# Patient Record
Sex: FEMALE | Race: WHITE | NOT HISPANIC OR LATINO | Employment: OTHER | URBAN - METROPOLITAN AREA
[De-identification: names, ages, dates, MRNs, and addresses within clinical notes are randomized per-mention and may not be internally consistent; named-entity substitution may affect disease eponyms.]

---

## 2021-05-28 ENCOUNTER — RECORDS - HEALTHEAST (OUTPATIENT)
Dept: ADMINISTRATIVE | Facility: CLINIC | Age: 31
End: 2021-05-28

## 2024-04-05 ENCOUNTER — HOSPITAL ENCOUNTER (EMERGENCY)
Facility: HOSPITAL | Age: 34
Discharge: HOME | End: 2024-04-05
Admitting: PHYSICIAN ASSISTANT
Payer: COMMERCIAL

## 2024-04-05 VITALS
DIASTOLIC BLOOD PRESSURE: 80 MMHG | HEART RATE: 105 BPM | SYSTOLIC BLOOD PRESSURE: 130 MMHG | HEIGHT: 65 IN | TEMPERATURE: 98 F | OXYGEN SATURATION: 98 % | RESPIRATION RATE: 20 BRPM | WEIGHT: 160 LBS | BODY MASS INDEX: 26.66 KG/M2

## 2024-04-05 DIAGNOSIS — K04.7 DENTAL INFECTION: ICD-10-CM

## 2024-04-05 LAB — HCG UR QL: NEGATIVE

## 2024-04-05 PROCEDURE — 99284 EMERGENCY DEPT VISIT MOD MDM: CPT

## 2024-04-05 PROCEDURE — 81025 URINE PREGNANCY TEST: CPT | Performed by: PHYSICIAN ASSISTANT

## 2024-04-05 PROCEDURE — 250N000013 HC RX MED GY IP 250 OP 250 PS 637: Performed by: PHYSICIAN ASSISTANT

## 2024-04-05 PROCEDURE — 96372 THER/PROPH/DIAG INJ SC/IM: CPT | Performed by: PHYSICIAN ASSISTANT

## 2024-04-05 PROCEDURE — 250N000011 HC RX IP 250 OP 636: Performed by: PHYSICIAN ASSISTANT

## 2024-04-05 RX ORDER — KETOROLAC TROMETHAMINE 30 MG/ML
30 INJECTION, SOLUTION INTRAMUSCULAR; INTRAVENOUS ONCE
Status: COMPLETED | OUTPATIENT
Start: 2024-04-05 | End: 2024-04-05

## 2024-04-05 RX ADMIN — KETOROLAC TROMETHAMINE 30 MG: 30 INJECTION, SOLUTION INTRAMUSCULAR; INTRAVENOUS at 10:26

## 2024-04-05 RX ADMIN — AMOXICILLIN AND CLAVULANATE POTASSIUM 1 TABLET: 875; 125 TABLET, FILM COATED ORAL at 09:59

## 2024-04-05 ASSESSMENT — COLUMBIA-SUICIDE SEVERITY RATING SCALE - C-SSRS
6. HAVE YOU EVER DONE ANYTHING, STARTED TO DO ANYTHING, OR PREPARED TO DO ANYTHING TO END YOUR LIFE?: NO
1. IN THE PAST MONTH, HAVE YOU WISHED YOU WERE DEAD OR WISHED YOU COULD GO TO SLEEP AND NOT WAKE UP?: NO

## 2024-04-05 ASSESSMENT — ACTIVITIES OF DAILY LIVING (ADL): ADLS_ACUITY_SCORE: 33

## 2024-04-05 NOTE — ED TRIAGE NOTES
Patient with right sided dental pain for 2 days, does have an appointment to see dentist next week.

## 2024-04-05 NOTE — ED PROVIDER NOTES
Emergency Department Encounter   NAME: Ijeoma Mixon ; AGE: 34 year old female ; YOB: 1990 ; MRN: [id number] ; PCP: No primary care provider on file.   ED PROVIDER: Linda Herzog PA-C    Evaluation Date & Time:   4/5/2024  9:10 AM    CHIEF COMPLAINT:  Dental Pain      Impression and Plan   MDM:   Ijeoma Mixon is a 34 year old female with a pertinent history of HIV, methamphetamine abuse, methadone use disorder, asthma, who presents to the ED by private vehicle for evaluation of dental pain.  The patient presents to the emergency department for evaluation of 1 week of worsening right upper dental pain.  Upon arrival to the ER, she is afebrile vitally stable.  She is in no acute distress and is nontoxic, however does appear uncomfortable.  Tooth #15 has previous filling, is cracked with exposed pulp, and has surrounding gingival fullness consistent with likely infection.  There is no drainable periapical abscess.  No evidence of a buccal cellulitis or deep soft tissue space infection.  No submandibular fullness, sublingual fullness or tenderness to suggest Kirill angina.  She does have HIV, however this has been well-controlled on Biktarvy, her last 2 HIV viral loads per chart review with undetectable levels. She is still taking her Biktarvy as prescribed without any missed doses. She does have right sided facial pain and headache though I suspect this is all referred pain from her tooth. No fevers, chills or systemic infectious symptoms. She is alert, oriented, and has no meningeal signs on exam. No concern for meningitis, encephalitis, or sinister infectious process at this time. Offered dental block, however patient declined. She requested Toradol which is reasonable and ordered.     Patient was started on Augmentin for dental infection.  Advised to keep her dental appointment on Wednesday as scheduled.  We reviewed concerning signs and symptoms to return to the emergency department at length  especially given her HIV history and she verbalized understanding.  Discharged home in stable condition.  Suspect her very mild elevated heart rate is due to her discomfort.    Medical Decision Making    History:  Supplemental history from: Documented in chart  External Record(s) reviewed: Outpatient Record: Clinic visit on 11/7/2023; HIV labs from 11/7/23 and 6/7/2023     Work Up:  Chart documentation includes differential considered and any EKGs or imaging independently interpreted by provider, where specified.  In additional to work up documented, I considered the following work up: Documented in chart, if applicable.    External consultation:  Discussion of management with another provider: Documented in chart, if applicable    Complicating factors:  Care impacted by chronic illness: Other: HIV; substance use;   Care affected by social determinants of health: Access to Medical Care    Disposition considerations: Discharge. I prescribed additional prescription strength medication(s) as charted. See documentation for any additional details.      ED COURSE:  9:43 AM I met and introduced myself to the patient. I gathered initial history and performed my physical exam. We discussed plan for initial workup.   10:15 AM I rechecked the patient and discussed results, discharge, follow up, and reasons to return to the ED.     At the conclusion of the encounter I discussed the results of all the tests and the disposition. The questions were answered. The patient or family acknowledged understanding and was agreeable with the care plan.    FINAL IMPRESSION:    ICD-10-CM    1. Dental infection  K04.7             MEDICATIONS GIVEN IN THE EMERGENCY DEPARTMENT:  Medications   ketorolac (TORADOL) injection 30 mg (30 mg Intramuscular $Given 4/5/24 1023)   amoxicillin-clavulanate (AUGMENTIN) 875-125 MG per tablet 1 tablet (1 tablet Oral $Given 4/5/24 3024)         NEW PRESCRIPTIONS STARTED AT TODAY'S ED VISIT:  Discharge  "Medication List as of 4/5/2024 10:27 AM        START taking these medications    Details   amoxicillin-clavulanate (AUGMENTIN) 875-125 MG tablet Take 1 tablet by mouth 2 times daily for 7 days, Disp-14 tablet, R-0, Local Print               HPI   Patient information was obtained from: Patient    Use of Intrepreter: N/A     Ijeoma Mixon is a 34 year old female with a pertinent history of HIV, methamphetamine abuse, methadone use disorder, asthma, who presents to the ED by private vehicle for evaluation of dental pain.     Per patient, she has had some ongoing right upper dental pain for some time.  She has a broken tooth at the site of a previous filling, however over the past week, pain has become significantly worse.  She was unable to get into her dentist and [place] until this Wednesday, and came into the ER due to pain.  She has not had fevers or chills, however she has had right-sided headache and facial pain as well as felt nauseated. No fevers or chills.  Has been using Tylenol and Motrin at home.    REVIEW OF SYSTEMS:  Pertinent positive and negative symptoms per HPI.       Medical History     No past medical history on file.    No past surgical history on file.    No family history on file.    Social History     Tobacco Use    Smoking status: Smoker, Current Status Unknown   Substance Use Topics    Alcohol use: No       amoxicillin-clavulanate (AUGMENTIN) 875-125 MG tablet  emtricitabine-tenofovir (TRUVADA) 200-300 mg per tablet  fluticasone (FLONASE) 50 mcg/actuation nasal spray  HYDROcodone-acetaminophen 5-325 mg per tablet  HYDROcodone-acetaminophen 5-325 mg per tablet  raltegravir (ISENTRESS) 400 mg tablet          Physical Exam     First Vitals:  Patient Vitals for the past 24 hrs:   BP Temp Pulse Resp SpO2 Height Weight   04/05/24 0908 130/80 98  F (36.7  C) 105 20 98 % 1.651 m (5' 5\") 72.6 kg (160 lb)         PHYSICAL EXAM:   Physical Exam  Constitutional:       General: She is not in acute " distress.     Appearance: She is not toxic-appearing.   HENT:      Mouth/Throat:      Mouth: Mucous membranes are moist.      Pharynx: Oropharynx is clear.      Comments: Posterior oropharynx is clear.  No sublingual edema or tenderness.  No tongue displacement.  No submandibular fullness.  No facial swelling or induration.  No trismus.  Speaking with normal phonation.  No drooling. Poor dentition. Right upper molar (tooth #15) had previous filling, is cracked with exposed pulp and is exquisitely tender. Surrounding gingival inflammation present. No fluctuance.   Neurological:      Mental Status: She is alert.             Results     LAB:  All pertinent labs reviewed and interpreted  Labs Ordered and Resulted from Time of ED Arrival to Time of ED Departure   HCG QUALITATIVE URINE - Normal       Result Value    hCG Urine Qualitative Negative         RADIOLOGY:  No orders to display         Linda Herzog PA-C   Emergency Medicine   [facility] EMERGENCY DEPARTMENT       Linda Herzog PA-C  04/05/24 2441

## 2024-04-05 NOTE — DISCHARGE INSTRUCTIONS
You were seen at the Hospital Emergency Department. Please bring this paper work to your followup appointment for the next provider to review and continue providing your care.  We believe that an infected tooth is the cause of your dental pain. For your infection, please take the antibiotic Augmentin as prescribed. Ifyour pain is not improved in 2 days with this therapy, please see a dentist. There is a list of several low/no cost dental clinics below.  If it anytime he develops fevers, chills, facial or neck swelling, difficulty breathing or swallowing, feel like your tongue is uplifted please return to the ER for further evaluation.  Take Tylenol or Ibuprofen for pain. Do not take more than:  Tylenol 1000 mgevery 6 hours (Max of 4000 mg per day)  Ibuprofen 600 mg every 6 hours (Max 3200 mg per day)  Please take your medicines as recommended above and review the discharge instructions for concerning signs/symptoms thatwould require your prompt return to the emergency department for further evaluation. Please follow up in clinic as we have recommended below. If your symptoms worsen prior to your follow up appointment, do not hesitate toreturn here to the emergency department for further evaluation. We'd be happy to see you again.   ---------------------------------------------------------------------------------------------------  Dental Clinics with no or reduced fees  [facility]   The [facility]  [address], [facility]  [phone] Accepts [place]    [facility]  [address], [facility]  [phone] No Fee Hours and services vary each month - call them before going. Sometimes only offer extractions.   [facility]  [address], [facility]  [phone] Sliding fee: ER visit - $50 up front  regular - $35 up front Call or arrive at 7:45 AM on Mondays, Tues, or Thursdays to sign up for same-dayappointments for dental pain / emergencies.        [facility]  Sliding fee  Call fordetails Walk-ins and same-day appointments  Walk-in's accepted 8-11AM and 1-4PM  Monday-Friday   [address]     [phone]          [facility] ([facility]) dental Sliding fee If no insurance, call first.    [address], [place]     [phone]          [facility]  [address], [place]  [phone] Sliding fee Call 8:00 AM Mon-Thurs for next-day  appointments (for emergencies/pain only)Help with [place] paperwork is available.        [facility]  [address], [facility]  [phone] Call for fees Only for adult dental emergencies. Costs about 30% less than private practiceclinics  To sign up for the regular dental clinic, call [phone].        [facility])  [address]  [phone] Sliding fee scale For people without dental insurance only.Cleaning, xray, exams, fillings, sealants only - no extractions or root canals, etc. No walk-ins.        [facility] / [address]  [phone] No Fee No walk-ins, not accepting people with insurance. Extractions for uninsured people only. Call Friday 2PM to get on next week's list        [facility]   [address]  [phone] Sliding fee  $40 up front No walk-ins. Call at 8AM Mon-Fri for same- day visits. Can schedule Saturday emergency visits by calling Friday morning.   [facility]  [address] [facility]  [phone] Sliding fee  Call for details No walk-ins. For emergency appointments:  Callon Thursday 3PM (for Friday appt) OR Call on Friday 3PM (for Monday appt)        [facility]  [address] [place] [address]  [phone] Sliding fee -   Call for details Call on Tuesdays at 3PMto get an urgent [place]. appointment. Call anytime during business hours to schedule other appointments.